# Patient Record
Sex: FEMALE | Race: WHITE | NOT HISPANIC OR LATINO | Employment: OTHER | URBAN - METROPOLITAN AREA
[De-identification: names, ages, dates, MRNs, and addresses within clinical notes are randomized per-mention and may not be internally consistent; named-entity substitution may affect disease eponyms.]

---

## 2018-03-20 ENCOUNTER — INITIAL CONSULT (OUTPATIENT)
Dept: OPHTHALMOLOGY | Facility: CLINIC | Age: 73
End: 2018-03-20
Payer: MEDICARE

## 2018-03-20 DIAGNOSIS — H40.1134 PRIMARY OPEN ANGLE GLAUCOMA (POAG) OF BOTH EYES, INDETERMINATE STAGE: Primary | ICD-10-CM

## 2018-03-20 DIAGNOSIS — H25.13 NUCLEAR SCLEROTIC CATARACT OF BOTH EYES: ICD-10-CM

## 2018-03-20 DIAGNOSIS — H25.043 POSTERIOR SUBCAPSULAR AGE-RELATED CATARACT OF BOTH EYES: ICD-10-CM

## 2018-03-20 PROCEDURE — 92020 GONIOSCOPY: CPT | Mod: S$PBB,,, | Performed by: OPHTHALMOLOGY

## 2018-03-20 PROCEDURE — 92250 FUNDUS PHOTOGRAPHY W/I&R: CPT | Mod: PBBFAC | Performed by: OPHTHALMOLOGY

## 2018-03-20 PROCEDURE — 99202 OFFICE O/P NEW SF 15 MIN: CPT | Mod: PBBFAC | Performed by: OPHTHALMOLOGY

## 2018-03-20 PROCEDURE — 99999 PR PBB SHADOW E&M-NEW PATIENT-LVL II: CPT | Mod: PBBFAC,,, | Performed by: OPHTHALMOLOGY

## 2018-03-20 PROCEDURE — 92004 COMPRE OPH EXAM NEW PT 1/>: CPT | Mod: S$PBB,,, | Performed by: OPHTHALMOLOGY

## 2018-03-20 PROCEDURE — 92020 GONIOSCOPY: CPT | Mod: PBBFAC | Performed by: OPHTHALMOLOGY

## 2018-03-20 RX ORDER — BRINZOLAMIDE/BRIMONIDINE TARTRATE 10; 2 MG/ML; MG/ML
1 SUSPENSION/ DROPS OPHTHALMIC 3 TIMES DAILY
COMMUNITY
Start: 2018-03-09 | End: 2022-10-10

## 2018-03-20 RX ORDER — TRAVOPROST 0.04 MG/ML
1 SOLUTION/ DROPS OPHTHALMIC NIGHTLY
COMMUNITY
Start: 2018-03-14 | End: 2021-10-08 | Stop reason: SDUPTHER

## 2018-03-20 RX ORDER — TIMOLOL MALEATE 5 MG/ML
1 SOLUTION/ DROPS OPHTHALMIC 2 TIMES DAILY
COMMUNITY
Start: 2018-03-14 | End: 2022-10-10

## 2018-03-20 NOTE — PROGRESS NOTES
HPI     Glaucoma    Additional comments: glaucoma eval           Comments   Pt here for glaucoma evaluation per Dr. Chad Weber. Pt states she has   been on glaucoma drops since 2014.     1. Severe POAG OU  2. NS OU    MEDS:  Timolol BID OU  Simbrinza TID OU  Travatan Z QHS OU       Last edited by Keisha Bravo MA on 3/20/2018  8:17 AM. (History)          Assessment /Plan     For exam results, see Encounter Report.    Primary open angle glaucoma (POAG) of both eyes, indeterminate stage    Nuclear sclerotic cataract of both eyes    Posterior subcapsular age-related cataract of both eyes           Glaucoma (type and duration)    2014, patient of [facility]   First HVF   ?   First photos   2018   Treatment / Drops started   Simbrinza, Timolol, Brandin Z           Family history    + mother, 4/10 siblings with glaucoma, brother had bad experience with surgery         Glaucoma meds   Simbrinza, Timolol, Brandin Z        H/O adverse rxn to glaucoma drops    none        LASERS    SLT OD 5/2016        GLAUCOMA SURGERIES   none        OTHER EYE SURGERIES    none        CDR    0.9/0.8        Tbase    21/20  On gtts        Tmax    21/20             Ttarget    ?             HVF    ? test 20? to  20? - ? od // ? os        Gonio    +3ou        CCT    500/496        OCT    ? test 20? to 20? - RNFL - ? od // ? os        HRT    ? test 20? to 20? - MR - ? od // ? os        Disc photos    2018    - Ttoday    21/20  - Test done today    Establish care, DFE, gonio, Photos    2. NS OU, PSC OU    Discussed options that she does need surgery for glaucoma  Discussed cataract and glaucoma surgery, glaucoma surgery alone, including various options (MIGS, Tube etc)  Patient is very hesitant to have any surgery.  Dr Weber recommended a tube or a Xen stent.  She does have PSC cataracts OU, which would likely progress with glaucoma surgery alone.     Her IOP today is too high for her nerve appearance, need fields. Patient to come back for HVF/OCT but will  also bring with old fields from from [facility] to see if any progression.     + Disc Hemorrhage on exam in left eye today.      Recheck for APD OD  Photos today  Cont present glaucoma drops    F/u for HVF/DFE/OCT at next available

## 2018-03-20 NOTE — LETTER
March 20, 2018      Chad Weber MD  [address] [place] [address]           [facility]  [address]  [facility] [address]  Phone: [phone]  Fax: [phone]          Patient: Silvia Machuca   MR Number: [id number]   YOB: 1945   Date of Visit: 3/20/2018       Dear Dr. Chad Weber:    Thank you for referring Silvia Machuca to me for evaluation. Attached you will find relevant portions of my assessment and plan of care.    If you have questions, please do not hesitate to call me. I look forward to following Silvia Machuca along with you.    Sincerely,    Mohini Winter MD    Enclosure  CC:  No Recipients    If you would like to receive this communication electronically, please contact [email or web address] or [phone] to request more information on [employer] Link access.    For providers and/or their staff who would like to refer a patient to [facility], please contact us through our one-stop-shop provider referral line, [facility], at [phone].    If you feel you have received this communication in error or would no longer like to receive these types of communications, please e-mail [email or web address]

## 2018-03-22 ENCOUNTER — CLINICAL SUPPORT (OUTPATIENT)
Dept: OPHTHALMOLOGY | Facility: CLINIC | Age: 73
End: 2018-03-22
Payer: MEDICARE

## 2018-03-22 ENCOUNTER — OFFICE VISIT (OUTPATIENT)
Dept: OPHTHALMOLOGY | Facility: CLINIC | Age: 73
End: 2018-03-22
Payer: MEDICARE

## 2018-03-22 DIAGNOSIS — H25.043 POSTERIOR SUBCAPSULAR AGE-RELATED CATARACT OF BOTH EYES: ICD-10-CM

## 2018-03-22 DIAGNOSIS — H40.1134 PRIMARY OPEN ANGLE GLAUCOMA (POAG) OF BOTH EYES, INDETERMINATE STAGE: Primary | ICD-10-CM

## 2018-03-22 DIAGNOSIS — H40.1134 PRIMARY OPEN ANGLE GLAUCOMA (POAG) OF BOTH EYES, INDETERMINATE STAGE: ICD-10-CM

## 2018-03-22 DIAGNOSIS — H25.13 NUCLEAR SCLEROTIC CATARACT OF BOTH EYES: ICD-10-CM

## 2018-03-22 PROCEDURE — 92083 EXTENDED VISUAL FIELD XM: CPT | Mod: 26,S$PBB,, | Performed by: OPHTHALMOLOGY

## 2018-03-22 PROCEDURE — 99999 PR PBB SHADOW E&M-EST. PATIENT-LVL I: CPT | Mod: PBBFAC,,, | Performed by: OPHTHALMOLOGY

## 2018-03-22 PROCEDURE — 99211 OFF/OP EST MAY X REQ PHY/QHP: CPT | Mod: PBBFAC | Performed by: OPHTHALMOLOGY

## 2018-03-22 PROCEDURE — 92012 INTRM OPH EXAM EST PATIENT: CPT | Mod: S$PBB,,, | Performed by: OPHTHALMOLOGY

## 2018-03-22 PROCEDURE — 92083 EXTENDED VISUAL FIELD XM: CPT | Mod: PBBFAC

## 2018-03-22 PROCEDURE — 92133 CPTRZD OPH DX IMG PST SGM ON: CPT | Mod: PBBFAC | Performed by: OPHTHALMOLOGY

## 2018-03-22 NOTE — PROGRESS NOTES
"        Assessment /Plan     For exam results, see Encounter Report.    Primary open angle glaucoma (POAG) of both eyes, indeterminate stage    Nuclear sclerotic cataract of both eyes    Posterior subcapsular age-related cataract of both eyes           Glaucoma (type and duration)    2014, patient of [facility]   First HVF   ?   First photos   2018   Treatment / Drops started   Simbrinza, Timolol, Brandin Z           Family history    + mother, 4/10 siblings with glaucoma, brother had bad experience with surgery         Glaucoma meds   Simbrinza, Timolol, Brandin Z        H/O adverse rxn to glaucoma drops    none        LASERS    SLT OD 5/2016        GLAUCOMA SURGERIES   none        OTHER EYE SURGERIES    none        CDR    0.9/0.8        Tbase    ?? Off gtts         Tmax    24 od on 9/11/2015 /20  Os          Ttarget    Ideally 12-16 ou              HVF    6 test 2014 to  2018 -  OD - SALT since 2014 // inf paracentral loss since 2016      OS  near full 2014 to 2016 / ++ prog with sup paracentral scotoma  since 6/2016  Os    ++ progression ou         Gonio    +3ou        CCT    500/496        OCT    ? test 20? to 20? - RNFL - ? od // ? os        HRT    ? test 20? to 20? - MR - ? od // ? os        Disc photos    2018 (disc heme os on 3/2018 - photos taken     - Ttoday    18/16  - Test done today   HVF / OCT /// overview of all VF's from 2014 to 2018     2. NS OU, PSC OU    Discussed options that she does need surgery for glaucoma  Discussed cataract and glaucoma surgery, glaucoma surgery alone, including various options (MIGS, Tube etc)  Patient is very hesitant to have any surgery.     Pt would like to have the "least invasive surgery" that might get the IOP where she needs it  I would suggest a phaco/IOL and express minishunt with [facility] - as in my hands it is most likely to give a low IOP.  However pt prefers NOT to do cataract surgery yet.  Therefore I feel a rebecca with [facility] a good option. It might give a low enough  IOP with a " functioning  Bleb. If the bleb scars down - bleb needling can sometimes revive it.     Would do the right eye first - but she needs some form of glaucoma filtering surgery in the left eye as well.  The left  eye now has a paracentral scotoma     After a through discussion  With the patient and her friend - a retired optometrist -  the pt will proceed with a rebecca on the right eye with Dr weber and see how it goes. Depending on how well it does she will consider doing the left eye at a latter date.     A copy of this note and of the HVF's and the OCT - given to the patient to take to Dr. Weber  The disc photos done on 3/20/2018 - 2 days ago - did not down load into OIS - properly - will ask them to re send them     Re- consult prn     I have seen and personally examined the patient.  I agree with the findings, assessment and plan of the resident and/or fellow.     Mohini Winter MD

## 2021-08-25 ENCOUNTER — CLINICAL SUPPORT (OUTPATIENT)
Dept: OPHTHALMOLOGY | Facility: CLINIC | Age: 76
End: 2021-08-25
Payer: MEDICARE

## 2021-08-25 ENCOUNTER — OFFICE VISIT (OUTPATIENT)
Dept: OPHTHALMOLOGY | Facility: CLINIC | Age: 76
End: 2021-08-25
Payer: MEDICARE

## 2021-08-25 DIAGNOSIS — H25.12 NUCLEAR SCLEROTIC CATARACT OF LEFT EYE: ICD-10-CM

## 2021-08-25 DIAGNOSIS — Z98.41 STATUS POST CATARACT EXTRACTION AND INSERTION OF INTRAOCULAR LENS OF RIGHT EYE: ICD-10-CM

## 2021-08-25 DIAGNOSIS — H26.492 POSTERIOR CAPSULAR OPACIFICATION OF LEFT EYE, OBSCURING VISION: ICD-10-CM

## 2021-08-25 DIAGNOSIS — H40.1133 PRIMARY OPEN ANGLE GLAUCOMA (POAG) OF BOTH EYES, SEVERE STAGE: Primary | ICD-10-CM

## 2021-08-25 DIAGNOSIS — Z96.1 STATUS POST CATARACT EXTRACTION AND INSERTION OF INTRAOCULAR LENS OF RIGHT EYE: ICD-10-CM

## 2021-08-25 DIAGNOSIS — H04.123 DRY EYE SYNDROME OF BOTH EYES: ICD-10-CM

## 2021-08-25 PROCEDURE — 92083 EXTENDED VISUAL FIELD XM: CPT | Mod: PBBFAC | Performed by: OPHTHALMOLOGY

## 2021-08-25 PROCEDURE — 99212 OFFICE O/P EST SF 10 MIN: CPT | Mod: PBBFAC | Performed by: OPHTHALMOLOGY

## 2021-08-25 PROCEDURE — 99204 OFFICE O/P NEW MOD 45 MIN: CPT | Mod: S$PBB,,, | Performed by: OPHTHALMOLOGY

## 2021-08-25 PROCEDURE — 92083 HUMPHREY VISUAL FIELD - OU - BOTH EYES: ICD-10-PCS | Mod: 26,S$PBB,, | Performed by: OPHTHALMOLOGY

## 2021-08-25 PROCEDURE — 76514 PR  US, EYE, FOR CORNEAL THICKNESS: ICD-10-PCS | Mod: 26,S$PBB,, | Performed by: OPHTHALMOLOGY

## 2021-08-25 PROCEDURE — 76514 ECHO EXAM OF EYE THICKNESS: CPT | Mod: 26,S$PBB,, | Performed by: OPHTHALMOLOGY

## 2021-08-25 PROCEDURE — 99999 PR PBB SHADOW E&M-EST. PATIENT-LVL II: CPT | Mod: PBBFAC,,, | Performed by: OPHTHALMOLOGY

## 2021-08-25 PROCEDURE — 99204 PR OFFICE/OUTPT VISIT, NEW, LEVL IV, 45-59 MIN: ICD-10-PCS | Mod: S$PBB,,, | Performed by: OPHTHALMOLOGY

## 2021-08-25 PROCEDURE — 92020 PR SPECIAL EYE EVAL,GONIOSCOPY: ICD-10-PCS | Mod: S$PBB,,, | Performed by: OPHTHALMOLOGY

## 2021-08-25 PROCEDURE — 92250 FUNDUS PHOTOGRAPHY W/I&R: CPT | Mod: PBBFAC | Performed by: OPHTHALMOLOGY

## 2021-08-25 PROCEDURE — 92250 COLOR FUNDUS PHOTOGRAPHY - OU - BOTH EYES: ICD-10-PCS | Mod: 26,S$PBB,, | Performed by: OPHTHALMOLOGY

## 2021-08-25 PROCEDURE — 76514 ECHO EXAM OF EYE THICKNESS: CPT | Mod: PBBFAC | Performed by: OPHTHALMOLOGY

## 2021-08-25 PROCEDURE — 92020 GONIOSCOPY: CPT | Mod: PBBFAC | Performed by: OPHTHALMOLOGY

## 2021-08-25 PROCEDURE — 92020 GONIOSCOPY: CPT | Mod: S$PBB,,, | Performed by: OPHTHALMOLOGY

## 2021-08-25 PROCEDURE — 99999 PR PBB SHADOW E&M-EST. PATIENT-LVL II: ICD-10-PCS | Mod: PBBFAC,,, | Performed by: OPHTHALMOLOGY

## 2021-08-27 ENCOUNTER — TELEPHONE (OUTPATIENT)
Dept: OPHTHALMOLOGY | Facility: CLINIC | Age: 76
End: 2021-08-27

## 2021-08-27 RX ORDER — BRIMONIDINE TARTRATE 1 MG/ML
1 SOLUTION/ DROPS OPHTHALMIC 2 TIMES DAILY
COMMUNITY
Start: 2021-07-15 | End: 2021-10-08

## 2021-08-27 RX ORDER — DORZOLAMIDE HYDROCHLORIDE AND TIMOLOL MALEATE PRESERVATIVE FREE 20; 5 MG/ML; MG/ML
1 SOLUTION/ DROPS OPHTHALMIC 2 TIMES DAILY
COMMUNITY
End: 2021-09-09 | Stop reason: SDUPTHER

## 2021-08-27 RX ORDER — AMLODIPINE BESYLATE 5 MG/1
TABLET ORAL
COMMUNITY
Start: 2021-04-16

## 2021-09-09 DIAGNOSIS — H40.1133 PRIMARY OPEN ANGLE GLAUCOMA (POAG) OF BOTH EYES, SEVERE STAGE: Primary | ICD-10-CM

## 2021-09-10 RX ORDER — DORZOLAMIDE HYDROCHLORIDE AND TIMOLOL MALEATE PRESERVATIVE FREE 20; 5 MG/ML; MG/ML
1 SOLUTION/ DROPS OPHTHALMIC 2 TIMES DAILY
Qty: 60 EACH | Refills: 3 | Status: SHIPPED | OUTPATIENT
Start: 2021-09-10 | End: 2022-10-10 | Stop reason: SDUPTHER

## 2021-10-08 ENCOUNTER — OFFICE VISIT (OUTPATIENT)
Dept: OPHTHALMOLOGY | Facility: CLINIC | Age: 76
End: 2021-10-08
Payer: MEDICARE

## 2021-10-08 DIAGNOSIS — H40.1133 PRIMARY OPEN ANGLE GLAUCOMA (POAG) OF BOTH EYES, SEVERE STAGE: Primary | ICD-10-CM

## 2021-10-08 DIAGNOSIS — H26.492 POSTERIOR CAPSULAR OPACIFICATION OF LEFT EYE, OBSCURING VISION: ICD-10-CM

## 2021-10-08 DIAGNOSIS — Z98.41 STATUS POST CATARACT EXTRACTION AND INSERTION OF INTRAOCULAR LENS OF RIGHT EYE: ICD-10-CM

## 2021-10-08 DIAGNOSIS — Z96.1 STATUS POST CATARACT EXTRACTION AND INSERTION OF INTRAOCULAR LENS OF RIGHT EYE: ICD-10-CM

## 2021-10-08 DIAGNOSIS — H04.123 DRY EYE SYNDROME OF BOTH EYES: ICD-10-CM

## 2021-10-08 DIAGNOSIS — H25.12 NUCLEAR SCLEROTIC CATARACT OF LEFT EYE: ICD-10-CM

## 2021-10-08 PROCEDURE — 99999 PR PBB SHADOW E&M-EST. PATIENT-LVL II: CPT | Mod: PBBFAC,,, | Performed by: OPHTHALMOLOGY

## 2021-10-08 PROCEDURE — 92133 POSTERIOR SEGMENT OCT OPTIC NERVE(OCULAR COHERENCE TOMOGRAPHY) - OU - BOTH EYES: ICD-10-PCS | Mod: 26,S$PBB,, | Performed by: OPHTHALMOLOGY

## 2021-10-08 PROCEDURE — 99214 OFFICE O/P EST MOD 30 MIN: CPT | Mod: S$PBB,,, | Performed by: OPHTHALMOLOGY

## 2021-10-08 PROCEDURE — 99999 PR PBB SHADOW E&M-EST. PATIENT-LVL II: ICD-10-PCS | Mod: PBBFAC,,, | Performed by: OPHTHALMOLOGY

## 2021-10-08 PROCEDURE — 99212 OFFICE O/P EST SF 10 MIN: CPT | Mod: PBBFAC | Performed by: OPHTHALMOLOGY

## 2021-10-08 PROCEDURE — 92133 CPTRZD OPH DX IMG PST SGM ON: CPT | Mod: PBBFAC | Performed by: OPHTHALMOLOGY

## 2021-10-08 PROCEDURE — 99214 PR OFFICE/OUTPT VISIT, EST, LEVL IV, 30-39 MIN: ICD-10-PCS | Mod: S$PBB,,, | Performed by: OPHTHALMOLOGY

## 2021-10-08 RX ORDER — BRIMONIDINE TARTRATE 2 MG/ML
1 SOLUTION/ DROPS OPHTHALMIC 2 TIMES DAILY
Qty: 5 ML | Refills: 6 | Status: SHIPPED | OUTPATIENT
Start: 2021-10-08 | End: 2022-01-10 | Stop reason: SDUPTHER

## 2021-10-08 RX ORDER — BRIMONIDINE TARTRATE 2 MG/ML
1 SOLUTION/ DROPS OPHTHALMIC 2 TIMES DAILY
COMMUNITY
End: 2021-10-08

## 2021-10-08 RX ORDER — TRAVOPROST 0.04 MG/ML
1 SOLUTION/ DROPS OPHTHALMIC NIGHTLY
Qty: 2.5 ML | Refills: 6 | Status: SHIPPED | OUTPATIENT
Start: 2021-10-08

## 2022-01-10 ENCOUNTER — OFFICE VISIT (OUTPATIENT)
Dept: OPHTHALMOLOGY | Facility: CLINIC | Age: 77
End: 2022-01-10
Payer: MEDICARE

## 2022-01-10 DIAGNOSIS — H04.123 DRY EYE SYNDROME OF BOTH EYES: ICD-10-CM

## 2022-01-10 DIAGNOSIS — Z96.1 STATUS POST CATARACT EXTRACTION AND INSERTION OF INTRAOCULAR LENS OF RIGHT EYE: ICD-10-CM

## 2022-01-10 DIAGNOSIS — H40.1133 PRIMARY OPEN ANGLE GLAUCOMA (POAG) OF BOTH EYES, SEVERE STAGE: Primary | ICD-10-CM

## 2022-01-10 DIAGNOSIS — H40.1133 PRIMARY OPEN ANGLE GLAUCOMA (POAG) OF BOTH EYES, SEVERE STAGE: ICD-10-CM

## 2022-01-10 DIAGNOSIS — H25.12 NUCLEAR SCLEROTIC CATARACT OF LEFT EYE: ICD-10-CM

## 2022-01-10 DIAGNOSIS — Z98.41 STATUS POST CATARACT EXTRACTION AND INSERTION OF INTRAOCULAR LENS OF RIGHT EYE: ICD-10-CM

## 2022-01-10 PROBLEM — H26.492 POSTERIOR CAPSULAR OPACIFICATION OF LEFT EYE, OBSCURING VISION: Status: RESOLVED | Noted: 2021-08-25 | Resolved: 2022-01-10

## 2022-01-10 PROCEDURE — 99999 PR PBB SHADOW E&M-EST. PATIENT-LVL II: ICD-10-PCS | Mod: PBBFAC,,, | Performed by: OPHTHALMOLOGY

## 2022-01-10 PROCEDURE — 99212 OFFICE O/P EST SF 10 MIN: CPT | Mod: PBBFAC | Performed by: OPHTHALMOLOGY

## 2022-01-10 PROCEDURE — 99214 PR OFFICE/OUTPT VISIT, EST, LEVL IV, 30-39 MIN: ICD-10-PCS | Mod: S$PBB,,, | Performed by: OPHTHALMOLOGY

## 2022-01-10 PROCEDURE — 99999 PR PBB SHADOW E&M-EST. PATIENT-LVL II: CPT | Mod: PBBFAC,,, | Performed by: OPHTHALMOLOGY

## 2022-01-10 PROCEDURE — 99214 OFFICE O/P EST MOD 30 MIN: CPT | Mod: S$PBB,,, | Performed by: OPHTHALMOLOGY

## 2022-01-10 RX ORDER — BRIMONIDINE TARTRATE 2 MG/ML
1 SOLUTION/ DROPS OPHTHALMIC 2 TIMES DAILY
Qty: 15 ML | Refills: 4 | Status: SHIPPED | OUTPATIENT
Start: 2022-01-10 | End: 2022-10-10 | Stop reason: SDUPTHER

## 2022-01-10 NOTE — PROGRESS NOTES
HPI     DLS: 10/08/2021  POAG OU   SLT OD 5/2016   S/p Ahmed/CE OD- 2/20/2019   S/p Xengel OD- 4/25/2018, Revision OD 5/29/2019     Cosopt PF BID OU   Alphagan TID  OU   Travatan Z QHS OU    Last edited by Sole Grimes MA on 1/10/2022  1:30 PM. (History)            Assessment /Plan     For exam results, see Encounter Report.    Primary open angle glaucoma (POAG) of both eyes, severe stage    Dry eye syndrome of both eyes    Status post cataract extraction and insertion of intraocular lens of right eye    Nuclear sclerotic cataract of left eye      Dr Tia Winter    Patient with daughter    Lives in [place]  Did ok with Lindsay    Advanced POAG OD > OS  Tmax @ 24 // 20 2015      CCT  493 // 493    Low teens --> close --> LWIT    Both eyes --> good adherence --> adjust  Cosopt PF BID  Brim 0.2% TID  Brandin q day --> try generic    Simbrinza --> holding    KK  Right eye  SP Xen Gel  04/25/2018  Revision x 2  Ahmed & CE IOL  02/20/2019      Re-discussed  OD Consider G6 / MP3 Laser  OS Consider SLT or CE IOL // Tube      PC IOL OD  Open   Quiet  Observe    NSC / PSC OS  CE PRN  Consider combined  Observe    Dry Eye Syndrome: discussed use of warm compresses, preserved & non-preserved artificial tears, gel and PM ointment options.  Also discussed options utilizing medications.      Plan  RTC 3 months IOP &  Adherence --> HVF & OCT RNFL  RTC sooner prn with good understanding

## 2022-06-21 ENCOUNTER — CLINICAL SUPPORT (OUTPATIENT)
Dept: OPHTHALMOLOGY | Facility: CLINIC | Age: 77
End: 2022-06-21
Payer: MEDICARE

## 2022-06-21 ENCOUNTER — OFFICE VISIT (OUTPATIENT)
Dept: OPHTHALMOLOGY | Facility: CLINIC | Age: 77
End: 2022-06-21
Payer: MEDICARE

## 2022-06-21 DIAGNOSIS — H40.1133 PRIMARY OPEN ANGLE GLAUCOMA (POAG) OF BOTH EYES, SEVERE STAGE: ICD-10-CM

## 2022-06-21 DIAGNOSIS — H40.1133 PRIMARY OPEN ANGLE GLAUCOMA (POAG) OF BOTH EYES, SEVERE STAGE: Primary | ICD-10-CM

## 2022-06-21 DIAGNOSIS — Z98.41 STATUS POST CATARACT EXTRACTION AND INSERTION OF INTRAOCULAR LENS OF RIGHT EYE: ICD-10-CM

## 2022-06-21 DIAGNOSIS — H25.12 NUCLEAR SCLEROTIC CATARACT OF LEFT EYE: ICD-10-CM

## 2022-06-21 DIAGNOSIS — H04.123 DRY EYE SYNDROME OF BOTH EYES: ICD-10-CM

## 2022-06-21 DIAGNOSIS — Z96.1 STATUS POST CATARACT EXTRACTION AND INSERTION OF INTRAOCULAR LENS OF RIGHT EYE: ICD-10-CM

## 2022-06-21 PROCEDURE — 99214 PR OFFICE/OUTPT VISIT, EST, LEVL IV, 30-39 MIN: ICD-10-PCS | Mod: S$PBB,,, | Performed by: OPHTHALMOLOGY

## 2022-06-21 PROCEDURE — 99213 OFFICE O/P EST LOW 20 MIN: CPT | Mod: PBBFAC | Performed by: OPHTHALMOLOGY

## 2022-06-21 PROCEDURE — 99999 PR PBB SHADOW E&M-EST. PATIENT-LVL III: ICD-10-PCS | Mod: PBBFAC,,, | Performed by: OPHTHALMOLOGY

## 2022-06-21 PROCEDURE — 99214 OFFICE O/P EST MOD 30 MIN: CPT | Mod: S$PBB,,, | Performed by: OPHTHALMOLOGY

## 2022-06-21 PROCEDURE — 99999 PR PBB SHADOW E&M-EST. PATIENT-LVL III: CPT | Mod: PBBFAC,,, | Performed by: OPHTHALMOLOGY

## 2022-06-21 RX ORDER — AMLODIPINE AND BENAZEPRIL HYDROCHLORIDE 5; 40 MG/1; MG/1
1 CAPSULE ORAL DAILY
COMMUNITY
Start: 2022-04-29

## 2022-06-21 NOTE — LETTER
June 21, 2022                     [facility] - [facility]  [address]  [facility] [address]  Phone: [phone]  Fax: [phone]   Patient: Silvia Machuca   MR Number: [id number]   YOB: 1945   Date of Visit: 6/21/2022   To whom it may concern:     Please excuse Silvia Machuca from giving inappropriate father's day cards.   Ms. Machuca has excellent vision and a great sense of humor.     Sincerely,      Thaddeus Diaz MD

## 2022-06-21 NOTE — PROGRESS NOTES
HPI     DLS: 01/10/2022    POAG OU   SLT OD 5/2016   S/p Ahmed/CE OD- 2/20/2019   S/p Xengel OD- 4/25/2018, Revision OD 5/29/2019     Cosopt PF BID OU   Alphagan TID  OU   Travatan Z QHS OU    Last edited by Sole Grimes MA on 6/21/2022  3:07 PM. (History)            Assessment /Plan     For exam results, see Encounter Report.    Primary open angle glaucoma (POAG) of both eyes, severe stage    Status post cataract extraction and insertion of intraocular lens of right eye    Nuclear sclerotic cataract of left eye    Dry eye syndrome of both eyes      Dr Tia Winter    Patient with daughter  Doing well    Lives in [place]  Did ok with Lindsay    Advanced POAG OD > OS  Tmax @ 24 // 20 2015      CCT  493 // 493    Low teens --> achieved    Both eyes --> good adherence --. CSM  Cosopt PF BID  Brim 0.2% TID  Brandin q day    Simbrinza --> holding    KK  Right eye  SP Xen Gel  04/25/2018  Revision x 2  Ahmed & CE IOL  02/20/2019    OD Consider G6 / MP3 Laser  OS Consider SLT or CE IOL // Tube      PC IOL OD  Open   Quiet  Observe    NSC / PSC OS  CE PRN  Consider combined  Observe    Dry Eye Syndrome: discussed use of warm compresses, preserved & non-preserved artificial tears, gel and PM ointment options.  Also discussed options utilizing medications.      Plan  RTC 4 months IOP & DFE & Adherence  RTC sooner prn with good understanding

## 2022-10-10 ENCOUNTER — OFFICE VISIT (OUTPATIENT)
Dept: OPHTHALMOLOGY | Facility: CLINIC | Age: 77
End: 2022-10-10
Payer: MEDICARE

## 2022-10-10 DIAGNOSIS — H40.1133 PRIMARY OPEN ANGLE GLAUCOMA (POAG) OF BOTH EYES, SEVERE STAGE: ICD-10-CM

## 2022-10-10 DIAGNOSIS — Z98.83 GLAUCOMA FILTERING BLEB OF RIGHT EYE: ICD-10-CM

## 2022-10-10 DIAGNOSIS — H04.123 DRY EYE SYNDROME OF BOTH EYES: ICD-10-CM

## 2022-10-10 DIAGNOSIS — Z98.41 STATUS POST CATARACT EXTRACTION AND INSERTION OF INTRAOCULAR LENS OF RIGHT EYE: ICD-10-CM

## 2022-10-10 DIAGNOSIS — Z96.1 STATUS POST CATARACT EXTRACTION AND INSERTION OF INTRAOCULAR LENS OF RIGHT EYE: ICD-10-CM

## 2022-10-10 DIAGNOSIS — Z96.89 HISTORY OF GLAUCOMA TUBE SHUNT PROCEDURE: ICD-10-CM

## 2022-10-10 DIAGNOSIS — H40.1133 PRIMARY OPEN ANGLE GLAUCOMA (POAG) OF BOTH EYES, SEVERE STAGE: Primary | ICD-10-CM

## 2022-10-10 DIAGNOSIS — H25.12 NUCLEAR SCLEROTIC CATARACT OF LEFT EYE: ICD-10-CM

## 2022-10-10 PROCEDURE — 99214 OFFICE O/P EST MOD 30 MIN: CPT | Mod: S$PBB,,, | Performed by: OPHTHALMOLOGY

## 2022-10-10 PROCEDURE — 99999 PR PBB SHADOW E&M-EST. PATIENT-LVL II: ICD-10-PCS | Mod: PBBFAC,,, | Performed by: OPHTHALMOLOGY

## 2022-10-10 PROCEDURE — 99212 OFFICE O/P EST SF 10 MIN: CPT | Mod: PBBFAC | Performed by: OPHTHALMOLOGY

## 2022-10-10 PROCEDURE — 99999 PR PBB SHADOW E&M-EST. PATIENT-LVL II: CPT | Mod: PBBFAC,,, | Performed by: OPHTHALMOLOGY

## 2022-10-10 PROCEDURE — 99214 PR OFFICE/OUTPT VISIT, EST, LEVL IV, 30-39 MIN: ICD-10-PCS | Mod: S$PBB,,, | Performed by: OPHTHALMOLOGY

## 2022-10-10 RX ORDER — BRIMONIDINE TARTRATE 2 MG/ML
1 SOLUTION/ DROPS OPHTHALMIC 2 TIMES DAILY
Qty: 15 ML | Refills: 4 | Status: SHIPPED | OUTPATIENT
Start: 2022-10-10 | End: 2023-04-03

## 2022-10-10 RX ORDER — DORZOLAMIDE HYDROCHLORIDE AND TIMOLOL MALEATE PRESERVATIVE FREE 20; 5 MG/ML; MG/ML
1 SOLUTION/ DROPS OPHTHALMIC 2 TIMES DAILY
Qty: 60 EACH | Refills: 3 | Status: SHIPPED | OUTPATIENT
Start: 2022-10-10

## 2022-10-10 NOTE — PROGRESS NOTES
HPI    DLS: 6/21/22    Pt here for 4 month IOP check and DFE.  Pt states she gets blurry VA OU in   the morning but clears up in the afternoon. Pt here with her daughter who   notices redness OU today.  Pt hasn't really noticed the redness.  Pt   denies itching OU.     POAG OU   SLT OD 5/2016   S/p Ahmed/CE OD- 2/20/2019   S/p Xengel OD- 4/25/2018, Revision OD 5/29/2019     Cosopt PF BID OU   Alphagan TID  OU (using BID)  Travatan Z QHS OU  Systane Balance BID OU    Last edited by Marisol Mello MA on 10/10/2022  1:47 PM.            Assessment /Plan     For exam results, see Encounter Report.    Primary open angle glaucoma (POAG) of both eyes, severe stage    Status post cataract extraction and insertion of intraocular lens of right eye    Nuclear sclerotic cataract of left eye    Dry eye syndrome of both eyes    Glaucoma filtering bleb of right eye    History of glaucoma tube shunt procedure    Dr Tia Winter    Patient with daughter  Get family checked  Son going to Optometry school in [place]      Lives in [place]  Did ok with Lindsay    Advanced POAG OD > OS  Tmax @ 24 // 20 2015      CCT  493 // 493    Low teens --> not achieved --> discussed options    Both eyes --> good adherence --> CSM  Cosopt PF BID  Brim 0.2% TID --> using BID --> TID  Brandin q day    Simbrinza --> holding    KK  Right eye  SP Xen Gel  04/25/2018  Revision x 2  Ahmed & CE IOL  02/20/2019        OD Consider G6 / MP3 Laser --> G-Probe --> discussed    OS Consider SLT or CE IOL // Tube      PC IOL OD  Open   Quiet  Observe    NSC / PSC OS  CE PRN  Consider combined  Observe    Dry Eye Syndrome: discussed use of warm compresses, preserved & non-preserved artificial tears, gel and PM ointment options.  Also discussed options utilizing medications.      Plan  RTC 4 months IOP & Adherence --> HVF 10-2 OD and HVF 24-2 OS & OCT RNFL  RTC sooner prn with good understanding                            Pt c/o right hand pain and swelling. . states she hit it about 2 days ago. Able to move digits with good radial pulse and cap refill.

## 2022-10-12 DIAGNOSIS — H40.1133 PRIMARY OPEN ANGLE GLAUCOMA (POAG) OF BOTH EYES, SEVERE STAGE: Primary | ICD-10-CM

## 2022-10-12 NOTE — TELEPHONE ENCOUNTER
Switched for Travaprost to Latanoprost because of cost savings. Ok'd by Dr. Diaz.    ----- Message from Saray Glass sent at 10/12/2022 11:09 AM CDT -----  Regarding: Speak with office  Contact: Silvia Leos is calling to speak with [facility] to change a Rx.    [phone]

## 2022-10-13 RX ORDER — LATANOPROST 50 UG/ML
1 SOLUTION/ DROPS OPHTHALMIC NIGHTLY
Qty: 2.5 ML | Refills: 11 | Status: SHIPPED | OUTPATIENT
Start: 2022-10-13 | End: 2023-07-06

## 2023-02-27 ENCOUNTER — CLINICAL SUPPORT (OUTPATIENT)
Dept: OPHTHALMOLOGY | Facility: CLINIC | Age: 78
End: 2023-02-27
Payer: MEDICARE

## 2023-02-27 ENCOUNTER — OFFICE VISIT (OUTPATIENT)
Dept: OPHTHALMOLOGY | Facility: CLINIC | Age: 78
End: 2023-02-27
Payer: MEDICARE

## 2023-02-27 DIAGNOSIS — H04.123 DRY EYE SYNDROME OF BOTH EYES: ICD-10-CM

## 2023-02-27 DIAGNOSIS — Z98.83 GLAUCOMA FILTERING BLEB OF RIGHT EYE: ICD-10-CM

## 2023-02-27 DIAGNOSIS — H40.1133 PRIMARY OPEN ANGLE GLAUCOMA (POAG) OF BOTH EYES, SEVERE STAGE: Primary | ICD-10-CM

## 2023-02-27 DIAGNOSIS — Z96.1 STATUS POST CATARACT EXTRACTION AND INSERTION OF INTRAOCULAR LENS OF RIGHT EYE: ICD-10-CM

## 2023-02-27 DIAGNOSIS — Z96.89 HISTORY OF GLAUCOMA TUBE SHUNT PROCEDURE: ICD-10-CM

## 2023-02-27 DIAGNOSIS — Z98.41 STATUS POST CATARACT EXTRACTION AND INSERTION OF INTRAOCULAR LENS OF RIGHT EYE: ICD-10-CM

## 2023-02-27 DIAGNOSIS — H25.12 NUCLEAR SCLEROTIC CATARACT OF LEFT EYE: ICD-10-CM

## 2023-02-27 PROCEDURE — 99212 OFFICE O/P EST SF 10 MIN: CPT | Mod: PBBFAC | Performed by: OPHTHALMOLOGY

## 2023-02-27 PROCEDURE — 99999 PR PBB SHADOW E&M-EST. PATIENT-LVL II: ICD-10-PCS | Mod: PBBFAC,,, | Performed by: OPHTHALMOLOGY

## 2023-02-27 PROCEDURE — 92133 CPTRZD OPH DX IMG PST SGM ON: CPT | Mod: PBBFAC | Performed by: OPHTHALMOLOGY

## 2023-02-27 PROCEDURE — 92083 HUMPHREY VISUAL FIELD - OU - BOTH EYES: ICD-10-PCS | Mod: 26,S$PBB,, | Performed by: OPHTHALMOLOGY

## 2023-02-27 PROCEDURE — 99214 PR OFFICE/OUTPT VISIT, EST, LEVL IV, 30-39 MIN: ICD-10-PCS | Mod: S$PBB,,, | Performed by: OPHTHALMOLOGY

## 2023-02-27 PROCEDURE — 99214 OFFICE O/P EST MOD 30 MIN: CPT | Mod: S$PBB,,, | Performed by: OPHTHALMOLOGY

## 2023-02-27 PROCEDURE — 92133 POSTERIOR SEGMENT OCT OPTIC NERVE(OCULAR COHERENCE TOMOGRAPHY) - OU - BOTH EYES: ICD-10-PCS | Mod: 26,S$PBB,, | Performed by: OPHTHALMOLOGY

## 2023-02-27 PROCEDURE — 92083 EXTENDED VISUAL FIELD XM: CPT | Mod: PBBFAC | Performed by: OPHTHALMOLOGY

## 2023-02-27 PROCEDURE — 99999 PR PBB SHADOW E&M-EST. PATIENT-LVL II: CPT | Mod: PBBFAC,,, | Performed by: OPHTHALMOLOGY

## 2023-02-27 NOTE — PROGRESS NOTES
Visual field test done.  Patient stated no latex allergies used coverlet      -0.50 + 1.00 x 76  -1.25 + 0.25 x 24

## 2023-02-27 NOTE — PROGRESS NOTES
HPI     Glaucoma     Additional comments: 4 mon iop chk/hvf rev/oct           Comments      POAG OU   SLT OD 5/2016   S/p Ahmed/CE OD- 2/20/2019   S/p Xengel OD- 4/25/2018, Revision OD 5/29/2019     Cosopt PF BID OU   Alphagan TID  OU (using BID)  Latanoprost QHS OU  Systane Balance BID OU            Last edited by Zion Larkin on 2/27/2023  1:59 PM.            Assessment /Plan     For exam results, see Encounter Report.    Primary open angle glaucoma (POAG) of both eyes, severe stage  -     Posterior Segment OCT Optic Nerve- Both eyes  -     Polanco Visual Field - OU - Extended - Both Eyes    History of glaucoma tube shunt procedure    Glaucoma filtering bleb of right eye    Status post cataract extraction and insertion of intraocular lens of right eye    Nuclear sclerotic cataract of left eye    Dry eye syndrome of both eyes      Dr Tia Winter    Patient with daughter  Get family checked  Son going to Optometry school in [place]      Lives in [place]  Did ok with Lindsay    Advanced POAG OD > OS  Tmax @ 24 // 20 2015      CCT  493 // 493    Low teens --> achieved --> discussed options    Both eyes --> good adherence --> CSM  Cosopt PF BID  Brim 0.2% TID --> using BID  Brandin q day    KK  Right eye  SP Xen Gel  04/25/2018  Revision x 2  Ahmed & CE IOL  02/20/2019      OD Consider G6 / MP3 Laser --> G-Probe --> discussed    OS Consider SLT or CE IOL // Tube      PC IOL OD  Open   Quiet  Observe    NSC / PSC OS  CE PRN --> doing with Va ADLs  Consider combined  Observe    Dry Eye Syndrome: discussed use of warm compresses, preserved & non-preserved artificial tears, gel and PM ointment options.  Also discussed options utilizing medications.      Plan  RTC 4 months Gonio, IOP & Adherence --> then DFE  RTC sooner prn with good understanding

## 2023-06-26 ENCOUNTER — OFFICE VISIT (OUTPATIENT)
Dept: OPHTHALMOLOGY | Facility: CLINIC | Age: 78
End: 2023-06-26
Payer: MEDICARE

## 2023-06-26 DIAGNOSIS — Z96.1 STATUS POST CATARACT EXTRACTION AND INSERTION OF INTRAOCULAR LENS OF RIGHT EYE: ICD-10-CM

## 2023-06-26 DIAGNOSIS — Z96.89 HISTORY OF GLAUCOMA TUBE SHUNT PROCEDURE: ICD-10-CM

## 2023-06-26 DIAGNOSIS — Z98.41 STATUS POST CATARACT EXTRACTION AND INSERTION OF INTRAOCULAR LENS OF RIGHT EYE: ICD-10-CM

## 2023-06-26 DIAGNOSIS — H04.123 DRY EYE SYNDROME OF BOTH EYES: ICD-10-CM

## 2023-06-26 DIAGNOSIS — H40.1133 PRIMARY OPEN ANGLE GLAUCOMA (POAG) OF BOTH EYES, SEVERE STAGE: Primary | ICD-10-CM

## 2023-06-26 DIAGNOSIS — H25.12 NUCLEAR SCLEROTIC CATARACT OF LEFT EYE: ICD-10-CM

## 2023-06-26 DIAGNOSIS — Z98.83 GLAUCOMA FILTERING BLEB OF RIGHT EYE: ICD-10-CM

## 2023-06-26 PROCEDURE — 99214 PR OFFICE/OUTPT VISIT, EST, LEVL IV, 30-39 MIN: ICD-10-PCS | Mod: S$PBB,,, | Performed by: OPHTHALMOLOGY

## 2023-06-26 PROCEDURE — 99212 OFFICE O/P EST SF 10 MIN: CPT | Mod: PBBFAC | Performed by: OPHTHALMOLOGY

## 2023-06-26 PROCEDURE — 92020 PR SPECIAL EYE EVAL,GONIOSCOPY: ICD-10-PCS | Mod: S$PBB,,, | Performed by: OPHTHALMOLOGY

## 2023-06-26 PROCEDURE — 92020 GONIOSCOPY: CPT | Mod: S$PBB,,, | Performed by: OPHTHALMOLOGY

## 2023-06-26 PROCEDURE — 99999 PR PBB SHADOW E&M-EST. PATIENT-LVL II: ICD-10-PCS | Mod: PBBFAC,,, | Performed by: OPHTHALMOLOGY

## 2023-06-26 PROCEDURE — 99214 OFFICE O/P EST MOD 30 MIN: CPT | Mod: S$PBB,,, | Performed by: OPHTHALMOLOGY

## 2023-06-26 PROCEDURE — 99999 PR PBB SHADOW E&M-EST. PATIENT-LVL II: CPT | Mod: PBBFAC,,, | Performed by: OPHTHALMOLOGY

## 2023-06-26 PROCEDURE — 92020 GONIOSCOPY: CPT | Mod: PBBFAC | Performed by: OPHTHALMOLOGY

## 2023-06-26 NOTE — PROGRESS NOTES
HPI     Glaucoma     Additional comments: 4 mon iop chk/gonio           Comments      POAG OU   SLT OD 5/2016   S/p Ahmed/CE OD- 2/20/2019   S/p Xengel OD- 4/25/2018, Revision OD 5/29/2019     Cosopt PF BID OU   Alphagan TID  OU   Latanoprost QHS OU  Systane Balance BID OU            Last edited by Zion Larkin on 6/26/2023  9:10 AM.            Assessment /Plan     For exam results, see Encounter Report.    Primary open angle glaucoma (POAG) of both eyes, severe stage  -     Posterior Segment OCT Optic Nerve- Both eyes; Future  -     Polanco Visual Field - OU - Extended - Both Eyes; Future    Nuclear sclerotic cataract of left eye    Dry eye syndrome of both eyes    Status post cataract extraction and insertion of intraocular lens of right eye    History of glaucoma tube shunt procedure    Glaucoma filtering bleb of right eye      Dr Tia Winter    Patient with daughter  Get family checked      Son going to Optometry school in [place]  Dr Andrews --> appreciates Brian Merritt      Lives in [place]  Did ok with Lindsay    Advanced POAG OD > OS  Tmax @ 24 // 20 2015      CCT  493 // 493    Low teens --> achieved    Both eyes --> tolerating well & good adherence --> CSM  Cosopt PF BID --> discussed drop Hygiene   Brim 0.2% TID  Brandin q day    KK  Right eye  SP Xen Gel  04/25/2018  Revision x 2  Ahmed & CE IOL  02/20/2019      OD Consider G6 / MP3 Laser --> G-Probe --> discussed    OS Consider SLT or CE IOL // Tube      PC IOL OD  Open   Quiet  Observe    NSC / PSC OS  CE PRN --> doing with Va ADLs  Consider combined  Observe    Dry Eye Syndrome: discussed use of warm compresses, preserved & non-preserved artificial tears, gel and PM ointment options.  Also discussed options utilizing medications.      Plan  RTC 4 months IOP & DFE & HVF OS & OCT RNFL & Adherence  RTC sooner prn with good understanding

## 2023-07-06 DIAGNOSIS — H40.1133 PRIMARY OPEN ANGLE GLAUCOMA (POAG) OF BOTH EYES, SEVERE STAGE: ICD-10-CM

## 2023-07-06 RX ORDER — LATANOPROST 50 UG/ML
SOLUTION/ DROPS OPHTHALMIC
Qty: 7.5 ML | Refills: 4 | Status: SHIPPED | OUTPATIENT
Start: 2023-07-06

## 2023-07-26 DIAGNOSIS — H40.1133 PRIMARY OPEN ANGLE GLAUCOMA (POAG) OF BOTH EYES, SEVERE STAGE: ICD-10-CM

## 2023-07-26 RX ORDER — BRIMONIDINE TARTRATE 2 MG/ML
1 SOLUTION/ DROPS OPHTHALMIC 3 TIMES DAILY
Qty: 15 ML | Refills: 6 | Status: SHIPPED | OUTPATIENT
Start: 2023-07-26

## 2023-07-26 NOTE — TELEPHONE ENCOUNTER
----- Message from Tracee Morton sent at 7/26/2023  9:24 AM CDT -----  Contact: pt @ [phone]  Silvia Machuca calling regarding Patient Advice (message) for #pt returning call from [facility], asking for call back

## 2023-10-20 ENCOUNTER — CLINICAL SUPPORT (OUTPATIENT)
Dept: OPHTHALMOLOGY | Facility: CLINIC | Age: 78
End: 2023-10-20
Payer: MEDICARE

## 2023-10-20 ENCOUNTER — OFFICE VISIT (OUTPATIENT)
Dept: OPHTHALMOLOGY | Facility: CLINIC | Age: 78
End: 2023-10-20
Payer: MEDICARE

## 2023-10-20 DIAGNOSIS — Z98.83 GLAUCOMA FILTERING BLEB OF RIGHT EYE: ICD-10-CM

## 2023-10-20 DIAGNOSIS — H04.123 DRY EYE SYNDROME OF BOTH EYES: ICD-10-CM

## 2023-10-20 DIAGNOSIS — H40.1133 PRIMARY OPEN ANGLE GLAUCOMA (POAG) OF BOTH EYES, SEVERE STAGE: ICD-10-CM

## 2023-10-20 DIAGNOSIS — H25.12 NUCLEAR SCLEROTIC CATARACT OF LEFT EYE: Primary | ICD-10-CM

## 2023-10-20 PROCEDURE — 99214 PR OFFICE/OUTPT VISIT, EST, LEVL IV, 30-39 MIN: ICD-10-PCS | Mod: S$PBB,,, | Performed by: OPHTHALMOLOGY

## 2023-10-20 PROCEDURE — 99999 PR PBB SHADOW E&M-EST. PATIENT-LVL III: ICD-10-PCS | Mod: PBBFAC,,, | Performed by: OPHTHALMOLOGY

## 2023-10-20 PROCEDURE — 92133 POSTERIOR SEGMENT OCT OPTIC NERVE(OCULAR COHERENCE TOMOGRAPHY) - OU - BOTH EYES: ICD-10-PCS | Mod: 26,S$PBB,, | Performed by: OPHTHALMOLOGY

## 2023-10-20 PROCEDURE — 92133 CPTRZD OPH DX IMG PST SGM ON: CPT | Mod: PBBFAC | Performed by: OPHTHALMOLOGY

## 2023-10-20 PROCEDURE — 92083 EXTENDED VISUAL FIELD XM: CPT | Mod: PBBFAC | Performed by: OPHTHALMOLOGY

## 2023-10-20 PROCEDURE — 92083 HUMPHREY VISUAL FIELD - OU - BOTH EYES: ICD-10-PCS | Mod: 26,S$PBB,, | Performed by: OPHTHALMOLOGY

## 2023-10-20 PROCEDURE — 99213 OFFICE O/P EST LOW 20 MIN: CPT | Mod: PBBFAC | Performed by: OPHTHALMOLOGY

## 2023-10-20 PROCEDURE — 99214 OFFICE O/P EST MOD 30 MIN: CPT | Mod: S$PBB,,, | Performed by: OPHTHALMOLOGY

## 2023-10-20 PROCEDURE — 99999 PR PBB SHADOW E&M-EST. PATIENT-LVL III: CPT | Mod: PBBFAC,,, | Performed by: OPHTHALMOLOGY

## 2023-10-20 NOTE — LETTER
October 20, 2023      [facility]  [address]  [facility] [address]  Phone: [phone]  Fax: [phone]       Patient: Silvia Machuca   YOB: 1945  Date of Visit: 10/20/2023    To Whom It May Concern:    Linda Machuca  was at [facility] on 10/20/2023.   Kristen Lim was present for this appointment.    If you have any questions or concerns, or if I can be of further assistance, please do not hesitate to contact me.    Sincerely,        Thaddeus Diaz M.D.     GILMA/lucy

## 2023-10-24 PROBLEM — I44.2 COMPLETE HEART BLOCK: Status: ACTIVE | Noted: 2023-10-24

## 2023-10-25 PROBLEM — R00.1 SYMPTOMATIC BRADYCARDIA: Status: ACTIVE | Noted: 2023-10-25

## 2024-02-06 ENCOUNTER — OFFICE VISIT (OUTPATIENT)
Dept: OPHTHALMOLOGY | Facility: CLINIC | Age: 79
End: 2024-02-06
Payer: MEDICARE

## 2024-02-06 DIAGNOSIS — H04.123 DRY EYE SYNDROME OF BOTH EYES: ICD-10-CM

## 2024-02-06 DIAGNOSIS — Z98.83 GLAUCOMA FILTERING BLEB OF RIGHT EYE: ICD-10-CM

## 2024-02-06 DIAGNOSIS — Z98.41 STATUS POST CATARACT EXTRACTION AND INSERTION OF INTRAOCULAR LENS OF RIGHT EYE: ICD-10-CM

## 2024-02-06 DIAGNOSIS — H35.371 EPIRETINAL MEMBRANE (ERM) OF RIGHT EYE: ICD-10-CM

## 2024-02-06 DIAGNOSIS — H40.1133 PRIMARY OPEN ANGLE GLAUCOMA (POAG) OF BOTH EYES, SEVERE STAGE: Primary | ICD-10-CM

## 2024-02-06 DIAGNOSIS — Z96.1 STATUS POST CATARACT EXTRACTION AND INSERTION OF INTRAOCULAR LENS OF RIGHT EYE: ICD-10-CM

## 2024-02-06 DIAGNOSIS — H25.12 NUCLEAR SCLEROTIC CATARACT OF LEFT EYE: ICD-10-CM

## 2024-02-06 DIAGNOSIS — Z96.89 HISTORY OF GLAUCOMA TUBE SHUNT PROCEDURE: ICD-10-CM

## 2024-02-06 PROCEDURE — 92020 GONIOSCOPY: CPT | Mod: S$PBB,,, | Performed by: OPHTHALMOLOGY

## 2024-02-06 PROCEDURE — 92020 GONIOSCOPY: CPT | Mod: PBBFAC | Performed by: OPHTHALMOLOGY

## 2024-02-06 PROCEDURE — 99214 OFFICE O/P EST MOD 30 MIN: CPT | Mod: S$PBB,,, | Performed by: OPHTHALMOLOGY

## 2024-02-06 PROCEDURE — 99213 OFFICE O/P EST LOW 20 MIN: CPT | Mod: PBBFAC | Performed by: OPHTHALMOLOGY

## 2024-02-06 PROCEDURE — G2211 COMPLEX E/M VISIT ADD ON: HCPCS | Mod: S$PBB,,, | Performed by: OPHTHALMOLOGY

## 2024-02-06 PROCEDURE — 99999 PR PBB SHADOW E&M-EST. PATIENT-LVL III: CPT | Mod: PBBFAC,,, | Performed by: OPHTHALMOLOGY

## 2024-02-06 NOTE — PROGRESS NOTES
HPI     Glaucoma     Additional comments: 4 mon iop chk           Comments        POAG OU   SLT OD 5/2016   S/p Ahmed/CE OD- 2/20/2019   S/p Xengel OD- 4/25/2018, Revision OD 5/29/2019     Cosopt PF BID OU   Alphagan TID  OU   Latanoprost QHS OU   Systane Balance BID OU            Last edited by Zion Larkin on 2/6/2024  1:57 PM.            Assessment /Plan     For exam results, see Encounter Report.    Primary open angle glaucoma (POAG) of both eyes, severe stage  -     Polanco Visual Field - OU - Extended - Both Eyes; Future    Nuclear sclerotic cataract of left eye    Dry eye syndrome of both eyes    Glaucoma filtering bleb of right eye    History of glaucoma tube shunt procedure    Status post cataract extraction and insertion of intraocular lens of right eye    Epiretinal membrane (ERM) of right eye      Dr Tia Winter    Patient with daughter  Get family checked      Son going to Optometry school in [place]  Dr Andrews --> appreciates Brian Merritt    Lives in [place]  Did ok with Lindsay    Advanced POAG OD > OS  Tmax @ 24 // 20 2015    Consider 10-2 OS      CCT  493 // 493    Low teens --> achieved    Both eyes --> tolerating well & good adherence --> CSM --> appreciates recipe  Cosopt PF BID --> has pacemaker on PO BB  Brim 0.2% TID  Brandin q day    KK  Right eye  SP Xen Gel  04/25/2018  Revision x 2  Ahmed & CE IOL  02/20/2019    OD Consider G6 / MP3 Laser --> G-Probe --> discussed    OS Consider SLT or CE IOL // Tube    PC IOL OD  Open   Quiet  Observe    NSC / PSC OS  CE PRN --> doing with Va ADLs  Consider combined  Observe --> discussed    ERM OD  Observe    Dry Eye Syndrome: discussed use of warm compresses, preserved & non-preserved artificial tears, gel and PM ointment options.  Also discussed options utilizing medications.      Plan  RTC 4 months IOP & Adherence & HVF 10-2 OS --> then DFE  RTC sooner prn with good understanding

## 2024-05-27 DIAGNOSIS — H40.1133 PRIMARY OPEN ANGLE GLAUCOMA (POAG) OF BOTH EYES, SEVERE STAGE: ICD-10-CM

## 2024-05-27 RX ORDER — BRIMONIDINE TARTRATE 2 MG/ML
1 SOLUTION/ DROPS OPHTHALMIC 3 TIMES DAILY
Qty: 15 ML | Refills: 6 | Status: SHIPPED | OUTPATIENT
Start: 2024-05-27

## 2024-06-11 ENCOUNTER — CLINICAL SUPPORT (OUTPATIENT)
Dept: OPHTHALMOLOGY | Facility: CLINIC | Age: 79
End: 2024-06-11
Payer: MEDICARE

## 2024-06-11 ENCOUNTER — OFFICE VISIT (OUTPATIENT)
Dept: OPHTHALMOLOGY | Facility: CLINIC | Age: 79
End: 2024-06-11
Payer: MEDICARE

## 2024-06-11 DIAGNOSIS — H04.123 DRY EYE SYNDROME OF BOTH EYES: ICD-10-CM

## 2024-06-11 DIAGNOSIS — Z98.83 GLAUCOMA FILTERING BLEB OF RIGHT EYE: ICD-10-CM

## 2024-06-11 DIAGNOSIS — H40.1133 PRIMARY OPEN ANGLE GLAUCOMA (POAG) OF BOTH EYES, SEVERE STAGE: ICD-10-CM

## 2024-06-11 DIAGNOSIS — Z98.41 STATUS POST CATARACT EXTRACTION AND INSERTION OF INTRAOCULAR LENS OF RIGHT EYE: ICD-10-CM

## 2024-06-11 DIAGNOSIS — H40.1133 PRIMARY OPEN ANGLE GLAUCOMA (POAG) OF BOTH EYES, SEVERE STAGE: Primary | ICD-10-CM

## 2024-06-11 DIAGNOSIS — Z96.89 HISTORY OF GLAUCOMA TUBE SHUNT PROCEDURE: ICD-10-CM

## 2024-06-11 DIAGNOSIS — H25.12 NUCLEAR SCLEROTIC CATARACT OF LEFT EYE: ICD-10-CM

## 2024-06-11 DIAGNOSIS — Z96.1 STATUS POST CATARACT EXTRACTION AND INSERTION OF INTRAOCULAR LENS OF RIGHT EYE: ICD-10-CM

## 2024-06-11 PROCEDURE — G2211 COMPLEX E/M VISIT ADD ON: HCPCS | Mod: S$PBB,,, | Performed by: OPHTHALMOLOGY

## 2024-06-11 PROCEDURE — 99212 OFFICE O/P EST SF 10 MIN: CPT | Mod: PBBFAC | Performed by: OPHTHALMOLOGY

## 2024-06-11 PROCEDURE — 99999 PR PBB SHADOW E&M-EST. PATIENT-LVL II: CPT | Mod: PBBFAC,,, | Performed by: OPHTHALMOLOGY

## 2024-06-11 PROCEDURE — 99214 OFFICE O/P EST MOD 30 MIN: CPT | Mod: S$PBB,,, | Performed by: OPHTHALMOLOGY

## 2024-06-12 NOTE — PROGRESS NOTES
HPI    DLS: 02/06/2024  HVF/IOP     POAG OU   SLT OD 5/2016   S/p Ahmed/CE OD- 2/20/2019   S/p Xengel OD- 4/25/2018, Revision OD 5/29/2019     Cosopt PF BID OU   Alphagan TID  OU   Latanoprost QHS OU   Systane Balance BID OU     Last edited by Sole Grimes MA on 6/11/2024 10:49 AM.            Assessment /Plan     For exam results, see Encounter Report.    Primary open angle glaucoma (POAG) of both eyes, severe stage    Nuclear sclerotic cataract of left eye    Dry eye syndrome of both eyes    Status post cataract extraction and insertion of intraocular lens of right eye    History of glaucoma tube shunt procedure    Glaucoma filtering bleb of right eye      Dr Tia Winter    Patient with daughter  Get family checked      Son going to Optometry school in [place]  Dr Andrews --> appreciates Brian Shaina    Lives in [place]  Did ok with Lindsay    Advanced POAG OD > OS  Tmax @ 24 // 20 2015    Consider 10-2 OS      CCT  493 // 493    Low teens --> --> push lower bonilla OS c HVF progression 06/11/2024    Both eyes --> tolerating well & good adherence --> CSM --> appreciates recipe  Cosopt PF BID --> has pacemaker on PO BB  Brim 0.2% TID  Brandin q day    KK  Right eye  SP Xen Gel  04/25/2018  Revision x 2  Ahmed & CE IOL  02/20/2019    OD Consider G6 / MP3 Laser --> G-Probe --> discussed    OS Consider SLT or CE IOL // Tube  ==> discussed options c HVF progression OS 06/11/2024  ==> Discussed R & B and expectations SLT OS    PC IOL OD  Open   Quiet  Observe    NSC / PSC OS  CE PRN --> doing with Va ADLs  Consider combined  Observe --> discussed    ERM OD  Observe    Dry Eye Syndrome: discussed use of warm compresses, preserved & non-preserved artificial tears, gel and PM ointment options.  Also discussed options utilizing medications.      Plan  RTC 1 month IOP and consider SLT OS & Adherence  --> then DFE & HVF 10-2 OS  RTC sooner prn with good understanding

## 2024-06-25 DIAGNOSIS — H40.1133 PRIMARY OPEN ANGLE GLAUCOMA (POAG) OF BOTH EYES, SEVERE STAGE: ICD-10-CM

## 2024-06-25 RX ORDER — DORZOLAMIDE HYDROCHLORIDE AND TIMOLOL MALEATE PRESERVATIVE FREE 20; 5 MG/ML; MG/ML
1 SOLUTION/ DROPS OPHTHALMIC 2 TIMES DAILY
Qty: 60 EACH | Refills: 3 | Status: SHIPPED | OUTPATIENT
Start: 2024-06-25

## 2024-07-09 ENCOUNTER — OFFICE VISIT (OUTPATIENT)
Dept: OPHTHALMOLOGY | Facility: CLINIC | Age: 79
End: 2024-07-09
Payer: MEDICARE

## 2024-07-09 DIAGNOSIS — H25.12 NUCLEAR SCLEROTIC CATARACT OF LEFT EYE: ICD-10-CM

## 2024-07-09 DIAGNOSIS — Z96.1 STATUS POST CATARACT EXTRACTION AND INSERTION OF INTRAOCULAR LENS OF RIGHT EYE: ICD-10-CM

## 2024-07-09 DIAGNOSIS — Z98.41 STATUS POST CATARACT EXTRACTION AND INSERTION OF INTRAOCULAR LENS OF RIGHT EYE: ICD-10-CM

## 2024-07-09 DIAGNOSIS — H40.1133 PRIMARY OPEN ANGLE GLAUCOMA (POAG) OF BOTH EYES, SEVERE STAGE: Primary | ICD-10-CM

## 2024-07-09 DIAGNOSIS — H40.1133 PRIMARY OPEN ANGLE GLAUCOMA (POAG) OF BOTH EYES, SEVERE STAGE: ICD-10-CM

## 2024-07-09 DIAGNOSIS — H04.123 DRY EYE SYNDROME OF BOTH EYES: ICD-10-CM

## 2024-07-09 DIAGNOSIS — Z96.89 HISTORY OF GLAUCOMA TUBE SHUNT PROCEDURE: ICD-10-CM

## 2024-07-09 DIAGNOSIS — Z98.83 GLAUCOMA FILTERING BLEB OF RIGHT EYE: ICD-10-CM

## 2024-07-09 PROCEDURE — 99213 OFFICE O/P EST LOW 20 MIN: CPT | Mod: PBBFAC | Performed by: OPHTHALMOLOGY

## 2024-07-09 PROCEDURE — 99214 OFFICE O/P EST MOD 30 MIN: CPT | Mod: 57,S$PBB,, | Performed by: OPHTHALMOLOGY

## 2024-07-09 PROCEDURE — 99999 PR PBB SHADOW E&M-EST. PATIENT-LVL III: CPT | Mod: PBBFAC,,, | Performed by: OPHTHALMOLOGY

## 2024-07-09 PROCEDURE — G2211 COMPLEX E/M VISIT ADD ON: HCPCS | Mod: S$PBB,,, | Performed by: OPHTHALMOLOGY

## 2024-07-09 PROCEDURE — 65855 TRABECULOPLASTY LASER SURG: CPT | Mod: PBBFAC,LT | Performed by: OPHTHALMOLOGY

## 2024-07-09 RX ORDER — BRIMONIDINE TARTRATE 2 MG/ML
1 SOLUTION/ DROPS OPHTHALMIC 3 TIMES DAILY
Qty: 15 ML | Refills: 6 | Status: SHIPPED | OUTPATIENT
Start: 2024-07-09

## 2024-07-09 RX ORDER — LATANOPROST 50 UG/ML
1 SOLUTION/ DROPS OPHTHALMIC NIGHTLY
Qty: 7.5 ML | Refills: 4 | Status: SHIPPED | OUTPATIENT
Start: 2024-07-09

## 2024-07-09 NOTE — PROGRESS NOTES
HPI    DLS: 06/11/2024  Possible SLT LEFT EYE  IOP check   POAG OU   SLT OD 5/2016   S/p Ahmed/CE OD- 2/20/2019   S/p Xengel OD- 4/25/2018, Revision OD 5/29/2019     Cosopt PF BID OU   Alphagan TID  OU   Latanoprost QHS OU   Systane Balance BID OU     Last edited by Sole Grimes MA on 7/9/2024  9:53 AM.            Assessment /Plan     For exam results, see Encounter Report.    Primary open angle glaucoma (POAG) of both eyes, severe stage  -     Argon Trabeculoplasty - OS - Left Eye    Nuclear sclerotic cataract of left eye    Dry eye syndrome of both eyes    Status post cataract extraction and insertion of intraocular lens of right eye    History of glaucoma tube shunt procedure    Glaucoma filtering bleb of right eye      Dr Tia Winter    Patient with daughter  Get family checked      Son going to Optometry school in [place]  Dr Andrews --> appreciates Brian Merritt    Lives in [place]  Did ok with Lindsay    Advanced POAG OD > OS  Tmax @ 24 // 20 2015    Consider 10-2 OS      CCT  493 // 493    Low teens --> not achieved --> push lower bonilla OS c HVF progression 06/11/2024    Re-discussed options    Both eyes --> tolerating well & good adherence --> CSM --> appreciates recipe  Cosopt PF BID --> has pacemaker on PO BB  Brim 0.2% TID  Brandin q day    KK  Right eye  SP Xen Gel  04/25/2018  Revision x 2  Ahmed & CE IOL  02/20/2019    OD Consider G6 / MP3 Laser --> G-Probe --> discussed    OS Consider SLT or CE IOL // Tube  ==> c HVF progression OS 06/11/2024  ==> re-Discussed R & B and expectations SLT OS    PC IOL OD  Open   Quiet  Observe    NSC / PSC OS  CE PRN --> doing with Va ADLs  Consider combined  Observe --> discussed    ERM OD  Observe    Dry Eye Syndrome: discussed use of warm compresses, preserved & non-preserved artificial tears, gel and PM ointment options.  Also discussed options utilizing medications.        Laser Trabeculoplasty  left eye    Glaucoma Laser Trabeculoplasty Surgery  Consent:  Patient with glaucoma and IOP control not at target for the health of the eye.  Discussed with patient options, risks and benefits, expectations of glaucoma laser surgery with questions and answers to facilitate discussion.  The  patient voice good understanding and wishes to proceed with surgery.  The patient will likely benefit from laser surgery and patient  signed consent for Left Eye.    The correct eye was identified by patient prior to start of the procedure.    Performed with Selective LT Yag    Total Energy:   55.8 mJ  Extent   360 Degrees  Total # of Exposures: 62 Applications --> Lumpy - Bumpy Angle    Patient tolerated procedure well       Silvia understands the information Dr. Diaz provided at the time of visit.  The patient voices good understanding of these these instructions and agrees with the plan.  Patient will return to clinic sooner as needed for pain, decreasing vision etc.    Possible:  Ketorolac 4x/day for 4 Days in eye with laser procedure        Plan  RTC 1 month IOP and p SLT OS & Adherence  --> then DFE & HVF 10-2 OS  RTC sooner prn with good understanding

## 2024-07-19 ENCOUNTER — TELEPHONE (OUTPATIENT)
Dept: OPHTHALMOLOGY | Facility: CLINIC | Age: 79
End: 2024-07-19
Payer: MEDICARE

## 2024-07-19 NOTE — TELEPHONE ENCOUNTER
Contacted pt- rescheduled appointment with Dr. Diaz.    ----- Message from Emmy Mcnamara sent at 7/19/2024  8:44 AM CDT -----  Pt calling in regards to needing yuri moved up an week , please call       Confirmed patient's contact info below:  Contact Name: Silvia Machuca  Phone Number: [phone]

## 2024-08-15 ENCOUNTER — OFFICE VISIT (OUTPATIENT)
Dept: OPHTHALMOLOGY | Facility: CLINIC | Age: 79
End: 2024-08-15
Payer: MEDICARE

## 2024-08-15 DIAGNOSIS — Z96.1 STATUS POST CATARACT EXTRACTION AND INSERTION OF INTRAOCULAR LENS OF RIGHT EYE: ICD-10-CM

## 2024-08-15 DIAGNOSIS — Z98.83 GLAUCOMA FILTERING BLEB OF RIGHT EYE: ICD-10-CM

## 2024-08-15 DIAGNOSIS — H40.1133 PRIMARY OPEN ANGLE GLAUCOMA (POAG) OF BOTH EYES, SEVERE STAGE: Primary | ICD-10-CM

## 2024-08-15 DIAGNOSIS — H04.123 DRY EYE SYNDROME OF BOTH EYES: ICD-10-CM

## 2024-08-15 DIAGNOSIS — Z96.89 HISTORY OF GLAUCOMA TUBE SHUNT PROCEDURE: ICD-10-CM

## 2024-08-15 DIAGNOSIS — H25.12 NUCLEAR SCLEROTIC CATARACT OF LEFT EYE: ICD-10-CM

## 2024-08-15 DIAGNOSIS — Z98.41 STATUS POST CATARACT EXTRACTION AND INSERTION OF INTRAOCULAR LENS OF RIGHT EYE: ICD-10-CM

## 2024-08-15 PROCEDURE — 99999 PR PBB SHADOW E&M-EST. PATIENT-LVL I: CPT | Mod: PBBFAC,,, | Performed by: OPHTHALMOLOGY

## 2024-08-15 PROCEDURE — 99214 OFFICE O/P EST MOD 30 MIN: CPT | Mod: S$PBB,,, | Performed by: OPHTHALMOLOGY

## 2024-08-15 PROCEDURE — G2211 COMPLEX E/M VISIT ADD ON: HCPCS | Mod: S$PBB,,, | Performed by: OPHTHALMOLOGY

## 2024-08-15 PROCEDURE — 99211 OFF/OP EST MAY X REQ PHY/QHP: CPT | Mod: PBBFAC | Performed by: OPHTHALMOLOGY

## 2024-08-15 NOTE — PROGRESS NOTES
HPI    DLS: 07/09/2024  IOP check     S/P SLT LEFT EYE 07/09/2024  IOP check   POAG OU   SLT OD 5/2016   S/p Ahmed/CE OD- 2/20/2019   S/p Xengel OD- 4/25/2018, Revision OD 5/29/2019     Cosopt PF BID OU   Alphagan TID  OU   Latanoprost QHS OU   Systane Balance BID OU     Last edited by Sole Grimes MA on 8/15/2024 10:02 AM.            Assessment /Plan     For exam results, see Encounter Report.    Primary open angle glaucoma (POAG) of both eyes, severe stage    Nuclear sclerotic cataract of left eye    Dry eye syndrome of both eyes    Status post cataract extraction and insertion of intraocular lens of right eye    History of glaucoma tube shunt procedure    Glaucoma filtering bleb of right eye      Dr Tia Winter    Patient with daughter  Get family checked      Son going to Optometry school in [place]  Dr Andrews --> appreciates Brian Merritt    Lives in [place]  Did ok with Lindsay    Advanced POAG OD > OS  Tmax @ 24 // 20 2015    Consider 10-2 OS      CCT  493 // 493    Low teens --> not achieved --> push lower bonilla OS c HVF progression 06/11/2024    Re-discussed options    Both eyes --> tolerating well & good adherence --> CSM --> appreciates recipe  Cosopt PF BID --> has pacemaker on PO BB  Brim 0.2% TID  Brandin q day    KK  Right eye  SP Xen Gel  04/25/2018  Revision x 2  Ahmed & CE IOL  02/20/2019    OS SP SLT 07/09/2024 --> did well    OD Consider G6 / MP3 Laser --> G-Probe --> discussed    OS CE IOL // Tube  ==> c HVF progression OS 06/11/2024 --> sp SLT OS 07/09/2024    PC IOL OD  Open   Quiet  Observe    NSC / PSC OS  CE PRN --> doing with [facility] ADLs  Consider combined  Observe --> discussed    ERM OD  No CME  Observe    Dry Eye Syndrome: discussed use of warm compresses, preserved & non-preserved artificial tears, gel and PM ointment options.  Also discussed options utilizing medications.      Plan  RTC 3 month IOP & Adherence  & DFE & HVF 10-2 OS  RTC sooner prn with good  understanding

## 2024-10-14 DIAGNOSIS — H40.1133 PRIMARY OPEN ANGLE GLAUCOMA (POAG) OF BOTH EYES, SEVERE STAGE: ICD-10-CM

## 2024-10-14 RX ORDER — LATANOPROST 50 UG/ML
1 SOLUTION/ DROPS OPHTHALMIC NIGHTLY
Qty: 7.5 ML | Refills: 3 | Status: SHIPPED | OUTPATIENT
Start: 2024-10-14

## 2024-10-14 RX ORDER — DORZOLAMIDE HYDROCHLORIDE AND TIMOLOL MALEATE PRESERVATIVE FREE 20; 5 MG/ML; MG/ML
1 SOLUTION/ DROPS OPHTHALMIC 2 TIMES DAILY
Qty: 60 EACH | Refills: 3 | Status: SHIPPED | OUTPATIENT
Start: 2024-10-14 | End: 2024-10-14

## 2024-10-14 RX ORDER — LATANOPROST 50 UG/ML
1 SOLUTION/ DROPS OPHTHALMIC DAILY
Qty: 2.5 ML | Refills: 1 | Status: SHIPPED | OUTPATIENT
Start: 2024-10-14 | End: 2025-10-14

## 2024-10-28 DIAGNOSIS — H40.1133 PRIMARY OPEN ANGLE GLAUCOMA (POAG) OF BOTH EYES, SEVERE STAGE: ICD-10-CM

## 2024-10-29 RX ORDER — DORZOLAMIDE HYDROCHLORIDE AND TIMOLOL MALEATE PRESERVATIVE FREE 20; 5 MG/ML; MG/ML
1 SOLUTION/ DROPS OPHTHALMIC 2 TIMES DAILY
Qty: 60 EACH | Refills: 4 | Status: SHIPPED | OUTPATIENT
Start: 2024-10-29

## 2024-11-26 ENCOUNTER — CLINICAL SUPPORT (OUTPATIENT)
Dept: OPHTHALMOLOGY | Facility: CLINIC | Age: 79
End: 2024-11-26
Payer: MEDICARE

## 2024-11-26 ENCOUNTER — OFFICE VISIT (OUTPATIENT)
Dept: OPHTHALMOLOGY | Facility: CLINIC | Age: 79
End: 2024-11-26
Payer: MEDICARE

## 2024-11-26 DIAGNOSIS — H04.123 DRY EYE SYNDROME OF BOTH EYES: ICD-10-CM

## 2024-11-26 DIAGNOSIS — H40.1133 PRIMARY OPEN ANGLE GLAUCOMA (POAG) OF BOTH EYES, SEVERE STAGE: ICD-10-CM

## 2024-11-26 DIAGNOSIS — H40.1133 PRIMARY OPEN ANGLE GLAUCOMA (POAG) OF BOTH EYES, SEVERE STAGE: Primary | ICD-10-CM

## 2024-11-26 DIAGNOSIS — Z96.1 PSEUDOPHAKIA OF RIGHT EYE: ICD-10-CM

## 2024-11-26 DIAGNOSIS — Z96.89 HISTORY OF GLAUCOMA TUBE SHUNT PROCEDURE: ICD-10-CM

## 2024-11-26 DIAGNOSIS — H25.12 NUCLEAR SCLEROTIC CATARACT OF LEFT EYE: ICD-10-CM

## 2024-11-26 DIAGNOSIS — Z98.83 GLAUCOMA FILTERING BLEB OF RIGHT EYE: ICD-10-CM

## 2024-11-26 DIAGNOSIS — H21.561 AFFERENT PUPILLARY DEFECT OF RIGHT EYE: ICD-10-CM

## 2024-11-26 PROBLEM — Z98.41 STATUS POST CATARACT EXTRACTION AND INSERTION OF INTRAOCULAR LENS OF RIGHT EYE: Status: RESOLVED | Noted: 2021-08-25 | Resolved: 2024-11-26

## 2024-11-26 PROCEDURE — G2211 COMPLEX E/M VISIT ADD ON: HCPCS | Mod: S$PBB,,, | Performed by: OPHTHALMOLOGY

## 2024-11-26 PROCEDURE — 99999 PR PBB SHADOW E&M-EST. PATIENT-LVL III: CPT | Mod: PBBFAC,,, | Performed by: OPHTHALMOLOGY

## 2024-11-26 PROCEDURE — 99213 OFFICE O/P EST LOW 20 MIN: CPT | Mod: PBBFAC | Performed by: OPHTHALMOLOGY

## 2024-11-26 PROCEDURE — 99214 OFFICE O/P EST MOD 30 MIN: CPT | Mod: S$PBB,,, | Performed by: OPHTHALMOLOGY

## 2024-11-26 NOTE — PROGRESS NOTES
HPI    DLS 08/15/2024    HVF OS/DFE/IOP  S/P SLT LEFT EYE 07/09/2024   IOP check   POAG OU   SLT OD 5/2016   S/p Ahmed/CE OD- 2/20/2019   S/p Xengel OD- 4/25/2018, Revision OD 5/29/2019     Cosopt PF BID OU   Alphagan TID  OU   Latanoprost QHS OU   Systane Balance BID OU     Last edited by Sole Grimes MA on 11/26/2024 10:47 AM.            Assessment /Plan     For exam results, see Encounter Report.    Primary open angle glaucoma (POAG) of both eyes, severe stage    Nuclear sclerotic cataract of left eye    Dry eye syndrome of both eyes    Glaucoma filtering bleb of right eye    History of glaucoma tube shunt procedure    Pseudophakia of right eye    Afferent pupillary defect of right eye      Dr Tia Winter    Patient with daughter  Get family checked      Grand Son going to Optometry school in [place]  Dr Andrews --> appreciates Brian Merritt    Lives in [place]  Did ok with Lindsay    Advanced POAG OD > OS  Tmax @ 24 // 20 2015    discussed 10-2 OS --> alternate      CCT  493 // 493    Low teens --> achieved  HVF progression 06/11/2024      Both eyes --> tolerating well & good adherence --> CSM  Cosopt PF BID --> has pacemaker on PO BB  Brim 0.2% TID  Brandin q day    KK  Right eye  SP Xen Gel  04/25/2018  Revision x 2  Ahmed & CE IOL  02/20/2019    OS SP SLT 07/09/2024    OD Consider G6 / MP3 Laser --> G-Probe    Consider OS CE IOL + Combined // MIGs --> holding    PC IOL OD  Open   Quiet  Observe    NSC / PSC OS  CE PRN --> doing with [facility] ADLs  Consider combined  Observe --> discussed    ERM OD  No CME  Observe    Dry Eye Syndrome: discussed use of warm compresses, preserved & non-preserved artificial tears, gel and PM ointment options.  Also discussed options utilizing medications.      Plan  RTC 4 month Gonio, IOP & Adherence  &  OCT RNFL  RTC sooner prn with good understanding

## 2025-02-20 DIAGNOSIS — H40.1133 PRIMARY OPEN ANGLE GLAUCOMA (POAG) OF BOTH EYES, SEVERE STAGE: ICD-10-CM

## 2025-02-20 RX ORDER — DORZOLAMIDE HYDROCHLORIDE AND TIMOLOL MALEATE PRESERVATIVE FREE 20; 5 MG/ML; MG/ML
1 SOLUTION/ DROPS OPHTHALMIC 2 TIMES DAILY
Qty: 60 EACH | Refills: 3 | Status: SHIPPED | OUTPATIENT
Start: 2025-02-20

## 2025-03-08 DIAGNOSIS — H40.1133 PRIMARY OPEN ANGLE GLAUCOMA (POAG) OF BOTH EYES, SEVERE STAGE: ICD-10-CM

## 2025-03-10 DIAGNOSIS — H40.1133 PRIMARY OPEN ANGLE GLAUCOMA (POAG) OF BOTH EYES, SEVERE STAGE: ICD-10-CM

## 2025-03-10 RX ORDER — BRIMONIDINE TARTRATE 2 MG/ML
1 SOLUTION/ DROPS OPHTHALMIC 3 TIMES DAILY
Qty: 15 ML | Refills: 6 | Status: CANCELLED | OUTPATIENT
Start: 2025-03-10

## 2025-03-10 RX ORDER — BRIMONIDINE TARTRATE 2 MG/ML
SOLUTION/ DROPS OPHTHALMIC
Qty: 15 ML | Refills: 6 | Status: SHIPPED | OUTPATIENT
Start: 2025-03-10

## 2025-03-29 DIAGNOSIS — H40.1133 PRIMARY OPEN ANGLE GLAUCOMA (POAG) OF BOTH EYES, SEVERE STAGE: ICD-10-CM

## 2025-03-31 RX ORDER — LATANOPROST 50 UG/ML
1 SOLUTION/ DROPS OPHTHALMIC DAILY
Qty: 2.5 ML | Refills: 11 | Status: SHIPPED | OUTPATIENT
Start: 2025-03-31 | End: 2026-03-31

## 2025-04-15 ENCOUNTER — OFFICE VISIT (OUTPATIENT)
Dept: OPHTHALMOLOGY | Facility: CLINIC | Age: 80
End: 2025-04-15
Payer: MEDICARE

## 2025-04-15 DIAGNOSIS — H04.123 DRY EYE SYNDROME OF BOTH EYES: ICD-10-CM

## 2025-04-15 DIAGNOSIS — H40.1133 PRIMARY OPEN ANGLE GLAUCOMA (POAG) OF BOTH EYES, SEVERE STAGE: Primary | ICD-10-CM

## 2025-04-15 DIAGNOSIS — H35.371 EPIRETINAL MEMBRANE (ERM) OF RIGHT EYE: ICD-10-CM

## 2025-04-15 DIAGNOSIS — H25.12 NUCLEAR SCLEROTIC CATARACT OF LEFT EYE: ICD-10-CM

## 2025-04-15 DIAGNOSIS — Z98.83 GLAUCOMA FILTERING BLEB OF RIGHT EYE: ICD-10-CM

## 2025-04-15 DIAGNOSIS — Z96.89 HISTORY OF GLAUCOMA TUBE SHUNT PROCEDURE: ICD-10-CM

## 2025-04-15 DIAGNOSIS — H21.561 AFFERENT PUPILLARY DEFECT OF RIGHT EYE: ICD-10-CM

## 2025-04-15 PROCEDURE — 92020 GONIOSCOPY: CPT | Mod: S$PBB,,, | Performed by: OPHTHALMOLOGY

## 2025-04-15 PROCEDURE — 92020 GONIOSCOPY: CPT | Mod: PBBFAC | Performed by: OPHTHALMOLOGY

## 2025-04-15 PROCEDURE — 99214 OFFICE O/P EST MOD 30 MIN: CPT | Mod: S$PBB,,, | Performed by: OPHTHALMOLOGY

## 2025-04-15 PROCEDURE — 99999 PR PBB SHADOW E&M-EST. PATIENT-LVL II: CPT | Mod: PBBFAC,,, | Performed by: OPHTHALMOLOGY

## 2025-04-15 PROCEDURE — G2211 COMPLEX E/M VISIT ADD ON: HCPCS | Mod: S$PBB,,, | Performed by: OPHTHALMOLOGY

## 2025-04-15 PROCEDURE — 99212 OFFICE O/P EST SF 10 MIN: CPT | Mod: PBBFAC | Performed by: OPHTHALMOLOGY

## 2025-04-15 PROCEDURE — 92133 CPTRZD OPH DX IMG PST SGM ON: CPT | Mod: PBBFAC | Performed by: OPHTHALMOLOGY

## 2025-04-15 NOTE — PROGRESS NOTES
HPI     Glaucoma     Additional comments: 4 mon iop chk/gonio/oct           Comments    Patient states that vision seems about the same as last visit in both   eyes.    S/P SLT LEFT EYE 07/09/2024   IOP check   POAG OU   SLT OD 5/2016   S/p Ahmed/CE OD- 2/20/2019   S/p Xengel OD- 4/25/2018, Revision OD 5/29/2019     Cosopt PF BID OU   Alphagan TID  OU   Latanoprost QHS OU   Systane Balance BID OU             Last edited by Zion Larkin on 4/15/2025 10:38 AM.            Assessment /Plan     For exam results, see Encounter Report.    Primary open angle glaucoma (POAG) of both eyes, severe stage  -     Posterior Segment OCT Optic Nerve- Both eyes  -     [place] Visual Field - OU - Extended - Both Eyes; Future    Nuclear sclerotic cataract of left eye    Epiretinal membrane (ERM) of right eye    Afferent pupillary defect of right eye    Dry eye syndrome of both eyes    Glaucoma filtering bleb of right eye    History of glaucoma tube shunt procedure      Dr Tia Winter    Patient with daughter  Get family checked      Grand Son going to Optometry school in [place]  Dr Andrews --> appreciates Brian Merritt    Lives in [place]  Did ok with Lindsay    Advanced POAG OD > OS  Tmax @ 24 // 20 2015    discussed 10-2 OS --> alternate      CCT  493 // 493    Low teens --> achieved  HVF progression 06/11/2024      Both eyes --> tolerating well & good adherence --> CSM  Cosopt PF BID --> has pacemaker on PO BB  Brim 0.2% TID  Brandin q day    KK  Right eye  SP Xen Gel  04/25/2018  Revision x 2  Ahmed & CE IOL  02/20/2019    OS SP SLT 07/09/2024    OD Consider G6 / MP3 Laser --> G-Probe    Consider OS CE IOL + Combined // MIGs --> discussed and Holding    PC IOL OD  Open   Quiet  Observe    NSC / PSC OS  CE PRN --> doing with [facility] ADLs  Consider combined  Observe --> discussed    ERM OD  No CME  Observe    Dry Eye Syndrome: discussed use of warm compresses, non-preserved artificial tears, gel and PM ointment options.      Plan  RTC 4  month Gonio, IOP & Adherence  &  OCT RNFL & HVF 10-2 OS  --> then DFE  RTC sooner prn with good understanding

## 2025-06-20 DIAGNOSIS — H40.1133 PRIMARY OPEN ANGLE GLAUCOMA (POAG) OF BOTH EYES, SEVERE STAGE: ICD-10-CM

## 2025-06-23 RX ORDER — DORZOLAMIDE HYDROCHLORIDE AND TIMOLOL MALEATE PRESERVATIVE FREE 20; 5 MG/ML; MG/ML
1 SOLUTION/ DROPS OPHTHALMIC 2 TIMES DAILY
Qty: 60 EACH | Refills: 4 | Status: SHIPPED | OUTPATIENT
Start: 2025-06-23

## 2025-08-11 ENCOUNTER — OFFICE VISIT (OUTPATIENT)
Dept: OPHTHALMOLOGY | Facility: CLINIC | Age: 80
End: 2025-08-11
Payer: MEDICARE

## 2025-08-11 DIAGNOSIS — H35.371 EPIRETINAL MEMBRANE (ERM) OF RIGHT EYE: ICD-10-CM

## 2025-08-11 DIAGNOSIS — H25.12 NUCLEAR SCLEROTIC CATARACT OF LEFT EYE: ICD-10-CM

## 2025-08-11 DIAGNOSIS — H21.561 AFFERENT PUPILLARY DEFECT OF RIGHT EYE: ICD-10-CM

## 2025-08-11 DIAGNOSIS — Z96.89 HISTORY OF GLAUCOMA TUBE SHUNT PROCEDURE: ICD-10-CM

## 2025-08-11 DIAGNOSIS — H40.1133 PRIMARY OPEN ANGLE GLAUCOMA (POAG) OF BOTH EYES, SEVERE STAGE: Primary | ICD-10-CM

## 2025-08-11 DIAGNOSIS — Z98.83 GLAUCOMA FILTERING BLEB OF RIGHT EYE: ICD-10-CM

## 2025-08-11 DIAGNOSIS — Z96.1 PSEUDOPHAKIA OF RIGHT EYE: ICD-10-CM

## 2025-08-11 DIAGNOSIS — H04.123 DRY EYE SYNDROME OF BOTH EYES: ICD-10-CM

## 2025-08-11 PROCEDURE — G2211 COMPLEX E/M VISIT ADD ON: HCPCS | Mod: ,,, | Performed by: OPHTHALMOLOGY

## 2025-08-11 PROCEDURE — 92133 CPTRZD OPH DX IMG PST SGM ON: CPT | Mod: PBBFAC | Performed by: OPHTHALMOLOGY

## 2025-08-11 PROCEDURE — 99999 PR PBB SHADOW E&M-EST. PATIENT-LVL III: CPT | Mod: PBBFAC,,, | Performed by: OPHTHALMOLOGY

## 2025-08-11 PROCEDURE — 99214 OFFICE O/P EST MOD 30 MIN: CPT | Mod: S$PBB,,, | Performed by: OPHTHALMOLOGY

## 2025-08-11 PROCEDURE — 99213 OFFICE O/P EST LOW 20 MIN: CPT | Mod: PBBFAC | Performed by: OPHTHALMOLOGY
